# Patient Record
Sex: MALE | Race: WHITE | NOT HISPANIC OR LATINO | URBAN - METROPOLITAN AREA
[De-identification: names, ages, dates, MRNs, and addresses within clinical notes are randomized per-mention and may not be internally consistent; named-entity substitution may affect disease eponyms.]

---

## 2023-10-25 RX ORDER — FLUOXETINE HYDROCHLORIDE 40 MG/1
40 CAPSULE ORAL DAILY
COMMUNITY
End: 2024-02-23 | Stop reason: WASHOUT

## 2023-11-16 ENCOUNTER — TELEPHONE (OUTPATIENT)
Dept: BEHAVIORAL HEALTH | Facility: CLINIC | Age: 18
End: 2023-11-16
Payer: COMMERCIAL

## 2023-11-16 RX ORDER — RISPERIDONE 1 MG/1
TABLET ORAL
COMMUNITY
End: 2024-02-15 | Stop reason: WASHOUT

## 2023-11-17 ENCOUNTER — TELEMEDICINE (OUTPATIENT)
Dept: BEHAVIORAL HEALTH | Facility: CLINIC | Age: 18
End: 2023-11-17
Payer: COMMERCIAL

## 2023-11-17 DIAGNOSIS — F41.1 GENERALIZED ANXIETY DISORDER: ICD-10-CM

## 2023-11-17 DIAGNOSIS — F41.1 GAD (GENERALIZED ANXIETY DISORDER): ICD-10-CM

## 2023-11-17 PROCEDURE — 99213 OFFICE O/P EST LOW 20 MIN: CPT | Performed by: CLINICAL NURSE SPECIALIST

## 2023-11-17 ASSESSMENT — ABNORMAL INVOLUNTARY MOVEMENT SCALE (AIMS)
AIMS_PATIENT_INCAPACITATION: NONE, NORMAL
LOWER_BODY_EXTREMITIES: NONE, NORMAL
PATIENT_WEARS_DENTURES: NO
AIMS_SEVERITY: 0
AIMS_PATIENT_AWARENESS: NO AWARENESS
FACIAL_EXPRESSION_MUSCLES: NONE, NORMAL
JAW: NONE, NORMAL
UPPER_BODY_EXTREMITIES: NONE, NORMAL
TONGUE: NONE, NORMAL
LIPS_PARIETAL: NONE, NORMAL
CURRENT_PROBLEMS_TEETH_DENTURES: NO
NECK_SHOULDER_HIPS: NONE, NORMAL

## 2023-11-17 NOTE — PROGRESS NOTES
"Outpatient Child and Adolescent Psychiatry      Treatment Plan/Recommendations:   continue Risperdal 1 mg 1 in am, 2 at night -anxiety, mood, impulse control   labs ordered and please obtain   continue Prozac 40 mg 1 daily - OCD, anxiety, depression   continue Prozac 10 mg 1 daily - OCD, anxiety, depression   continue in therapy - Roxy Toney   see me in 3 - 6 months   Mauro in agreement, call with questions     Provider Impression:   Anxiety, mood - stable medication of benefit   ADHD - doing well, not on medication   ASD - continues to work on social skills     Diagnosis: There is no problem list on file for this patient.      Reason for Visit:   Anxiety, depression , ASD, ADHD     HPI:   Mauro is a 18 y.o male who lives with parents, 2 younger brothers, taking classes at [employer].  Last seen in May.  Continues to take Prozac 50 mg along with risperdal 1 mg, 1 in am and 2 at night for depression, anxiety, OCD. No reported side effects.   Mauro states things are going well with school and work.    He has good grades.        He says things at home with mom and dad are  \"great\"       Will see his grandparents over the holidays which he is looking forward to.        He says his mood has been pretty good especially when things are going well and his way.  He has been doing well keeping his cool even in situations that may be challenging.  He walks away in a calm manner.  He is feeling positive about his future in terms of a career.  He has not had any SI or thoughts of death. He did feel hopeless about his [employer] class last semester, but he is going to retake it next semester and feels positive about this.    He has friends and they hang out.   He has not had any significant OCD symptoms in a long time   no tics recently   sleeping and eating well   weight is in the 190s , hoping to get a gym membership and go with his mom   He does not think he had labs last year   He feels good about his medication and that is works " well        Review of Systems    Depressive Symptoms:. denies symptoms, no reported thoughts of death or SI, no self harm.   Anxiety Symptoms:. stable   Inattentive Symptoms:. doing well in school and work.   Other Symptoms/ Concerns:. ASD - making progress with social skills , continues to work on flexibility and self calming.   All other systems have been reviewed and are negative for complaint.     Constitutional: normal sleeping and normal appetite.   Cardiovascular: no chest pain and no palpitations.   Respiratory: no asthma/reactive airway disease.   Gastrointestinal: no abdominal pain and no reflux.   Neurological: not recent tics,  no headache and no seizures   Hematologic/Lymphatic: Labs WNL May 2022.         Current Medications:    Current Outpatient Medications:     FLUoxetine (PROzac) 40 mg capsule, TAKE 1 CAPSULE BY MOUTH EVERY DAY, Disp: 90 capsule, Rfl: 0    FLUoxetine (PROzac) 40 mg capsule, Take 1 capsule (40 mg) by mouth once daily., Disp: , Rfl:     risperiDONE (RisperDAL) 1 mg tablet, TAKE 1 TABLET BY MOUTH EVERY MORNING AND 2 TABLETS AT NIGHT, Disp: 270 tablet, Rfl: 0    risperiDONE (RisperDAL) 1 mg tablet, TAKE 1 TABLET BY MOUTH EVERY MORNING AND 2 TABLETS AT NIGHT, Disp: , Rfl:       No family history on file.   Past Medical History:   Diagnosis Date    Attention-deficit hyperactivity disorder, other type 05/22/2017    Attention-deficit hyperactivity disorder, other type    Autistic disorder 05/13/2022    Autism spectrum disorder    Depression, unspecified 03/10/2022    Depression    Generalized anxiety disorder 05/13/2022    Generalized anxiety disorder    Other specified health status     Known health problems: none    Tic disorder, unspecified 07/03/2018    Tic disorder    Tic disorder, unspecified 03/10/2022    Obsessive-compulsive disorder with absent insight or delusional beliefs, tic-related          Objective   Mental Status Exam:    See screening     Leonard Garcia, APRN-CNS

## 2023-12-04 ENCOUNTER — LAB (OUTPATIENT)
Dept: LAB | Facility: LAB | Age: 18
End: 2023-12-04
Payer: COMMERCIAL

## 2023-12-04 DIAGNOSIS — R17 ELEVATED BILIRUBIN: ICD-10-CM

## 2023-12-04 DIAGNOSIS — F41.1 GAD (GENERALIZED ANXIETY DISORDER): ICD-10-CM

## 2023-12-04 LAB
ALBUMIN SERPL BCP-MCNC: 4.5 G/DL (ref 3.4–5)
ALP SERPL-CCNC: 72 U/L (ref 33–120)
ALT SERPL W P-5'-P-CCNC: 49 U/L (ref 10–52)
ANION GAP SERPL CALC-SCNC: 11 MMOL/L (ref 10–20)
AST SERPL W P-5'-P-CCNC: 26 U/L (ref 9–39)
BASOPHILS # BLD AUTO: 0.02 X10*3/UL (ref 0–0.1)
BASOPHILS NFR BLD AUTO: 0.4 %
BILIRUB SERPL-MCNC: 1.5 MG/DL (ref 0–1.2)
BUN SERPL-MCNC: 15 MG/DL (ref 6–23)
CALCIUM SERPL-MCNC: 9.4 MG/DL (ref 8.6–10.3)
CHLORIDE SERPL-SCNC: 105 MMOL/L (ref 98–107)
CHOLEST SERPL-MCNC: 150 MG/DL (ref 0–199)
CHOLESTEROL/HDL RATIO: 4
CO2 SERPL-SCNC: 25 MMOL/L (ref 21–32)
CREAT SERPL-MCNC: 0.8 MG/DL (ref 0.5–1.3)
EOSINOPHIL # BLD AUTO: 0.19 X10*3/UL (ref 0–0.7)
EOSINOPHIL NFR BLD AUTO: 3.7 %
ERYTHROCYTE [DISTWIDTH] IN BLOOD BY AUTOMATED COUNT: 11.9 % (ref 11.5–14.5)
GFR SERPL CREATININE-BSD FRML MDRD: >90 ML/MIN/1.73M*2
GLUCOSE SERPL-MCNC: 77 MG/DL (ref 74–99)
HCT VFR BLD AUTO: 45 % (ref 41–52)
HDLC SERPL-MCNC: 37.1 MG/DL
HGB BLD-MCNC: 15 G/DL (ref 13.5–17.5)
IMM GRANULOCYTES # BLD AUTO: 0.02 X10*3/UL (ref 0–0.7)
IMM GRANULOCYTES NFR BLD AUTO: 0.4 % (ref 0–0.9)
LDLC SERPL CALC-MCNC: 99 MG/DL
LYMPHOCYTES # BLD AUTO: 1.7 X10*3/UL (ref 1.2–4.8)
LYMPHOCYTES NFR BLD AUTO: 33.5 %
MCH RBC QN AUTO: 29.4 PG (ref 26–34)
MCHC RBC AUTO-ENTMCNC: 33.3 G/DL (ref 32–36)
MCV RBC AUTO: 88 FL (ref 80–100)
MONOCYTES # BLD AUTO: 0.45 X10*3/UL (ref 0.1–1)
MONOCYTES NFR BLD AUTO: 8.9 %
NEUTROPHILS # BLD AUTO: 2.69 X10*3/UL (ref 1.2–7.7)
NEUTROPHILS NFR BLD AUTO: 53.1 %
NON HDL CHOLESTEROL: 113 MG/DL (ref 0–119)
NRBC BLD-RTO: 0 /100 WBCS (ref 0–0)
PLATELET # BLD AUTO: 248 X10*3/UL (ref 150–450)
POTASSIUM SERPL-SCNC: 4.1 MMOL/L (ref 3.5–5.3)
PROT SERPL-MCNC: 7.1 G/DL (ref 6.4–8.2)
RBC # BLD AUTO: 5.11 X10*6/UL (ref 4.5–5.9)
SODIUM SERPL-SCNC: 137 MMOL/L (ref 136–145)
TRIGL SERPL-MCNC: 72 MG/DL (ref 0–149)
VLDL: 14 MG/DL (ref 0–40)
WBC # BLD AUTO: 5.1 X10*3/UL (ref 4.4–11.3)

## 2023-12-04 PROCEDURE — 80061 LIPID PANEL: CPT

## 2023-12-04 PROCEDURE — 36415 COLL VENOUS BLD VENIPUNCTURE: CPT

## 2023-12-04 PROCEDURE — 82248 BILIRUBIN DIRECT: CPT

## 2023-12-04 PROCEDURE — 83036 HEMOGLOBIN GLYCOSYLATED A1C: CPT

## 2023-12-04 PROCEDURE — 80053 COMPREHEN METABOLIC PANEL: CPT

## 2023-12-04 PROCEDURE — 85025 COMPLETE CBC W/AUTO DIFF WBC: CPT

## 2023-12-05 ENCOUNTER — TELEPHONE (OUTPATIENT)
Dept: BEHAVIORAL HEALTH | Facility: CLINIC | Age: 18
End: 2023-12-05
Payer: COMMERCIAL

## 2023-12-05 DIAGNOSIS — F42.9 OBSESSIVE-COMPULSIVE DISORDER, UNSPECIFIED TYPE: ICD-10-CM

## 2023-12-05 LAB
EST. AVERAGE GLUCOSE BLD GHB EST-MCNC: 94 MG/DL
HBA1C MFR BLD: 4.9 %

## 2023-12-05 NOTE — PROGRESS NOTES
Reviewed labs with mother, bilirubin slightly elevated, other liver function WNL   Reviewed risperdal can impact liver function, bilirubin not as common, can reduce dose and recheck, also would see PCP to rule out other reason   Mother in agreement   Lower risperdal 1 mg 1 in am, 1 1/2 at night (lowering by 0.5 mg)   Will recheck labs in a month  Update with any concerns with lower dose   Mother in agreement

## 2023-12-06 ENCOUNTER — OFFICE VISIT (OUTPATIENT)
Dept: PRIMARY CARE | Facility: CLINIC | Age: 18
End: 2023-12-06
Payer: COMMERCIAL

## 2023-12-06 VITALS
HEIGHT: 71 IN | HEART RATE: 72 BPM | WEIGHT: 210 LBS | BODY MASS INDEX: 29.4 KG/M2 | SYSTOLIC BLOOD PRESSURE: 120 MMHG | DIASTOLIC BLOOD PRESSURE: 78 MMHG

## 2023-12-06 DIAGNOSIS — F84.0 AUTISM SPECTRUM DISORDER (HHS-HCC): ICD-10-CM

## 2023-12-06 DIAGNOSIS — M79.672 LEFT FOOT PAIN: ICD-10-CM

## 2023-12-06 DIAGNOSIS — R17 ELEVATED BILIRUBIN: Primary | ICD-10-CM

## 2023-12-06 PROBLEM — D72.819 LEUKOPENIA: Status: ACTIVE | Noted: 2023-12-06

## 2023-12-06 PROBLEM — F32.A DEPRESSION: Status: ACTIVE | Noted: 2023-12-06

## 2023-12-06 PROBLEM — F90.8 ATTENTION-DEFICIT HYPERACTIVITY DISORDER, OTHER TYPE: Status: ACTIVE | Noted: 2023-12-06

## 2023-12-06 PROBLEM — F95.9 TIC DISORDER: Status: ACTIVE | Noted: 2023-12-06

## 2023-12-06 PROBLEM — F41.1 GENERALIZED ANXIETY DISORDER: Status: ACTIVE | Noted: 2023-12-06

## 2023-12-06 LAB — BILIRUB DIRECT SERPL-MCNC: 0.2 MG/DL (ref 0–0.3)

## 2023-12-06 PROCEDURE — 99214 OFFICE O/P EST MOD 30 MIN: CPT | Performed by: FAMILY MEDICINE

## 2023-12-06 PROCEDURE — 1036F TOBACCO NON-USER: CPT | Performed by: FAMILY MEDICINE

## 2023-12-06 ASSESSMENT — PATIENT HEALTH QUESTIONNAIRE - PHQ9
SUM OF ALL RESPONSES TO PHQ9 QUESTIONS 1 AND 2: 0
1. LITTLE INTEREST OR PLEASURE IN DOING THINGS: NOT AT ALL
2. FEELING DOWN, DEPRESSED OR HOPELESS: NOT AT ALL

## 2023-12-06 NOTE — ASSESSMENT & PLAN NOTE
I agree that his elevated bilirubin is likely secondary to either Risperdal and/or Gilbert's syndrome.  I think it is reasonable to attempt to reduce the Risperdal dose, but then consider obtaining another bilirubin about 4 weeks after doing so    Your conjugated, or direct bilirubin from the blood work from December 4 was noted to be 0.2 which indicates that your indirect bilirubin is 1.3 which is a bit elevated so this is something we will continue to monitor going forward    If it continues to stay elevated after decreasing the Risperdal dose, we will get a liver ultrasound and maybe even consider a hepatology consult

## 2023-12-06 NOTE — ASSESSMENT & PLAN NOTE
I do not think that there is any worry about a stress fracture, although may be a likely strain of the ATF  Suggested new shoe wear that has nice heel and arch supports

## 2023-12-06 NOTE — PROGRESS NOTES
Subjective   Patient ID: Mauro Lindsay is a 18 y.o. male who presents for lab review.    Past Medical, Surgical, and Family History reviewed and updated in chart.    Reviewed all medications by prescribing practitioner or clinical pharmacist (such as prescriptions, OTCs, herbal therapies and supplements) and documented in the medical record.    HPI  1.  Asymptomatic elevated total bilirubin.  Mauro has a medical history notable for Obsessive-Compulsive Disorder (OCD), Autism, Anxiety, and Depression. His psychiatric medications have been managed by a Nurse Practitioner since his seventh-grade year. He is currently on Prozac 40 mg and Risperdal 3 mg.    The patient is now presenting with asymptomatic total hyperbilirubinemia. His most recent Comprehensive Metabolic Panel (CMP) was remarkable for a total bilirubin of 1.5 while the rest of his liver enzymes were within normal limits. He denies any current right upper quadrant pain or jaundice, even during periods of heightened stress.    Given his elevated bilirubin levels, his Nurse Practitioner is planning to reduce his Risperdal dosage from 3 mg to 2.5 mg daily. However, Mauro's mother reports that they have not yet reduced the medication dose, as they plan on doing it after his final examinations.    2.  Left foot pain.  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    All other systems have been reviewed and are negative and noncontributory to this patient's current ailments.    Past Medical History:   Diagnosis Date    Attention-deficit hyperactivity disorder, other type 05/22/2017    Attention-deficit  "hyperactivity disorder, other type    Autistic disorder 05/13/2022    Autism spectrum disorder    Depression, unspecified 03/10/2022    Depression    Generalized anxiety disorder 05/13/2022    Generalized anxiety disorder    Other specified health status     Known health problems: none    Tic disorder, unspecified 07/03/2018    Tic disorder    Tic disorder, unspecified 03/10/2022    Obsessive-compulsive disorder with absent insight or delusional beliefs, tic-related     Past Surgical History:   Procedure Laterality Date    OTHER SURGICAL HISTORY  04/27/2015    Prior Surgical Procedure Not Done     Social History     Tobacco Use    Smoking status: Never    Smokeless tobacco: Never   Substance Use Topics    Alcohol use: Never    Drug use: Never     No family history on file.  Immunization History   Administered Date(s) Administered    Flu vaccine (IIV4), preservative free *Check age/dose* 12/07/2015, 11/05/2016, 11/15/2017, 12/06/2019, 10/08/2020, 10/08/2021, 09/22/2022    HPV 9-valent vaccine (GARDASIL 9) 02/05/2020, 09/09/2020, 02/10/2021    Influenza, injectable, MDCK, preservative free, quadrivalent 11/07/2018    Meningococcal MCV4P 06/17/2016, 02/10/2021    Pfizer Purple Cap SARS-CoV-2 04/05/2021, 04/26/2021, 12/01/2021    Tdap vaccine, age 7 year and older (BOOSTRIX) 04/27/2015     Current Outpatient Medications   Medication Instructions    FLUoxetine (PROZAC) 40 mg, oral, Daily    FLUoxetine (PROZAC) 40 mg, oral, Daily    risperiDONE (RisperDAL) 1 mg tablet TAKE 1 TABLET BY MOUTH EVERY MORNING AND 2 TABLETS AT NIGHT    risperiDONE (RisperDAL) 1 mg tablet TAKE 1 TABLET BY MOUTH EVERY MORNING AND 2 TABLETS AT NIGHT     No Known Allergies    Objective   Vitals:    12/06/23 0934   BP: 120/78   Pulse: 72   Weight: 95.3 kg (210 lb)   Height: 1.795 m (5' 10.67\")     Body mass index is 29.56 kg/m².    BP Readings from Last 3 Encounters:   12/06/23 120/78   07/27/22 134/72   07/27/22 109/69      Wt Readings from Last 3 " Encounters:   12/06/23 95.3 kg (210 lb)   07/27/22 85.4 kg (188 lb 4 oz) (88 %, Z= 1.16)*   03/11/21 88 kg (93 %, Z= 1.46)*     * Growth percentiles are based on [facility] (Boys, 2-20 Years) data.        Lab on 12/04/2023   Component Date Value    WBC 12/04/2023 5.1     nRBC 12/04/2023 0.0     RBC 12/04/2023 5.11     Hemoglobin 12/04/2023 15.0     Hematocrit 12/04/2023 45.0     MCV 12/04/2023 88     MCH 12/04/2023 29.4     MCHC 12/04/2023 33.3     RDW 12/04/2023 11.9     Platelets 12/04/2023 248     Neutrophils % 12/04/2023 53.1     Immature Granulocytes %,* 12/04/2023 0.4     Lymphocytes % 12/04/2023 33.5     Monocytes % 12/04/2023 8.9     Eosinophils % 12/04/2023 3.7     Basophils % 12/04/2023 0.4     Neutrophils Absolute 12/04/2023 2.69     Immature Granulocytes Ab* 12/04/2023 0.02     Lymphocytes Absolute 12/04/2023 1.70     Monocytes Absolute 12/04/2023 0.45     Eosinophils Absolute 12/04/2023 0.19     Basophils Absolute 12/04/2023 0.02     Glucose 12/04/2023 77     Sodium 12/04/2023 137     Potassium 12/04/2023 4.1     Chloride 12/04/2023 105     Bicarbonate 12/04/2023 25     Anion Gap 12/04/2023 11     Urea Nitrogen 12/04/2023 15     Creatinine 12/04/2023 0.80     eGFR 12/04/2023 >90     Calcium 12/04/2023 9.4     Albumin 12/04/2023 4.5     Alkaline Phosphatase 12/04/2023 72     Total Protein 12/04/2023 7.1     AST 12/04/2023 26     Bilirubin, Total 12/04/2023 1.5 (H)     ALT 12/04/2023 49     Hemoglobin A1C 12/04/2023 4.9     Estimated Average Glucose 12/04/2023 94     Cholesterol 12/04/2023 150     HDL-Cholesterol 12/04/2023 37.1     Cholesterol/HDL Ratio 12/04/2023 4.0     LDL Calculated 12/04/2023 99     VLDL 12/04/2023 14     Triglycerides 12/04/2023 72     Non HDL Cholesterol 12/04/2023 113     Bilirubin, Direct 12/04/2023 0.2      Physical Exam  CONSTITUTIONAL - well nourished, well developed, looks like stated age, in no acute distress, not ill-appearing, and not tired appearing  SKIN - normal skin color  and pigmentation, normal skin turgor without rash, lesions, or nodules visualized  HEAD - no trauma, normocephalic  EYES - pupils are equal and reactive to light, extraocular muscles are intact, and normal external exam  CHEST - clear to auscultation, no wheezing, no crackles and no rales, good effort  CARDIAC - regular rate and regular rhythm, no skipped beats, no murmur  ABDOMEN - no organomegaly, soft, nontender, nondistended, normal bowel sounds, no guarding/rebound/rigidity, negative McBurney sign and negative Edwards sign  EXTREMITIES - no obvious or evident edema, no obvious or evident deformities; left foot with some reproducible tenderness along the ATF, but no obvious bony deformities, swelling, warmth, ecchymosis, or decreased range of motion  NEUROLOGICAL - normal gait, normal balance, normal motor, no ataxia, DTRs equal and symmetrical; alert, oriented and no focal signs  PSYCHIATRIC - alert, pleasant and cordial, age-appropriate    Assessment/Plan   Problem List Items Addressed This Visit       Autism spectrum disorder     Continue to follow with NP in psychiatry for medication adjustments         Elevated bilirubin - Primary     I agree that his elevated bilirubin is likely secondary to either Risperdal and/or Gilbert's syndrome.  I think it is reasonable to attempt to reduce the Risperdal dose, but then consider obtaining another bilirubin about 4 weeks after doing so    Your conjugated, or direct bilirubin from the blood work from December 4 was noted to be 0.2 which indicates that your indirect bilirubin is 1.3 which is a bit elevated so this is something we will continue to monitor going forward    If it continues to stay elevated after decreasing the Risperdal dose, we will get a liver ultrasound and maybe even consider a hepatology consult         Relevant Orders    Bilirubin, direct (Completed)    Bilirubin, direct    Bilirubin, total    Left foot pain     I do not think that there is any worry  about a stress fracture, although may be a likely strain of the ATF  Suggested new shoe wear that has nice heel and arch supports

## 2023-12-07 NOTE — RESULT ENCOUNTER NOTE
Direct bilirubin is 0.2 which means the indirect is 1.3    We will continue with the plan, taper the Risperdal, repeat blood work 4 weeks, and if it continues to remain elevated then liver ultrasound

## 2024-02-15 DIAGNOSIS — F41.1 GENERALIZED ANXIETY DISORDER: ICD-10-CM

## 2024-02-15 RX ORDER — RISPERIDONE 0.5 MG/1
TABLET ORAL
Qty: 90 TABLET | Refills: 0 | Status: SHIPPED | OUTPATIENT
Start: 2024-02-15 | End: 2024-05-09

## 2024-02-15 RX ORDER — RISPERIDONE 1 MG/1
1 TABLET ORAL 2 TIMES DAILY
Qty: 180 TABLET | Refills: 0 | Status: SHIPPED | OUTPATIENT
Start: 2024-02-15 | End: 2024-05-17 | Stop reason: SDUPTHER

## 2024-02-15 NOTE — PROGRESS NOTES
Mother states Mauro reduced risperdal to 1 mg in am 1.5 mg at night   Asked for 0. 5 mg pill so not having to break the 1 mg   Sent in new scripts and made apt for next Friday 23rd at 10 am

## 2024-02-23 ENCOUNTER — LAB (OUTPATIENT)
Dept: LAB | Facility: LAB | Age: 19
End: 2024-02-23
Payer: COMMERCIAL

## 2024-02-23 ENCOUNTER — TELEMEDICINE (OUTPATIENT)
Dept: BEHAVIORAL HEALTH | Facility: CLINIC | Age: 19
End: 2024-02-23
Payer: COMMERCIAL

## 2024-02-23 DIAGNOSIS — F41.1 GENERALIZED ANXIETY DISORDER: ICD-10-CM

## 2024-02-23 DIAGNOSIS — F84.0 AUTISM SPECTRUM DISORDER (HHS-HCC): ICD-10-CM

## 2024-02-23 DIAGNOSIS — F90.2 ATTENTION DEFICIT HYPERACTIVITY DISORDER (ADHD), COMBINED TYPE: ICD-10-CM

## 2024-02-23 DIAGNOSIS — R17 ELEVATED BILIRUBIN: ICD-10-CM

## 2024-02-23 LAB — BILIRUB DIRECT SERPL-MCNC: 0.2 MG/DL (ref 0–0.3)

## 2024-02-23 PROCEDURE — 1036F TOBACCO NON-USER: CPT | Performed by: CLINICAL NURSE SPECIALIST

## 2024-02-23 PROCEDURE — 99213 OFFICE O/P EST LOW 20 MIN: CPT | Performed by: CLINICAL NURSE SPECIALIST

## 2024-02-23 PROCEDURE — 82247 BILIRUBIN TOTAL: CPT

## 2024-02-23 PROCEDURE — 82248 BILIRUBIN DIRECT: CPT

## 2024-02-23 RX ORDER — FLUOXETINE 10 MG/1
CAPSULE ORAL
Qty: 90 CAPSULE | Refills: 0 | Status: SHIPPED | OUTPATIENT
Start: 2024-02-23 | End: 2024-05-17 | Stop reason: SDUPTHER

## 2024-02-23 RX ORDER — FLUOXETINE HYDROCHLORIDE 40 MG/1
40 CAPSULE ORAL DAILY
Qty: 90 CAPSULE | Refills: 0 | Status: SHIPPED | OUTPATIENT
Start: 2024-02-23 | End: 2024-05-17 | Stop reason: SDUPTHER

## 2024-02-23 NOTE — PROGRESS NOTES
Outpatient Child and Adolescent Psychiatry      Treatment Plan/Recommendations:   continue Risperdal 1 mg po BID and risperdal 0.5 mg 1 at night -anxiety, mood, impulse control   labs completed today   Will assess to further lower over time   continue Prozac 40 mg 1 daily - OCD, anxiety, depression   continue Prozac 10 mg 1 daily - OCD, anxiety, depression   continue in therapy - Roxy Toney   see me in 3 months   Mauro in agreement, call with questions     Provider Impression:   Anxiety, mood - stable with lower dose risperdal   ADHD - doing well, not on medication   ASD - significant progress with social skills     Diagnosis: There is no problem list on file for this patient.      Reason for Visit:   Anxiety, depression , ASD, ADHD     HPI:   Mauro is a 18 y.o male who lives with parents, 2 younger brothers, taking classes at [employer].  Last seen November  Continues to take Prozac 50 mg daily (40 mg and 10 mg) lowered risperdal a few months ago to 1 mg in am, 1.5 mg at night. Says he had some trouble sleeping when lowered initially, but overall has been stable.   Mauro states things are going well at school and work.  He states he has spring break coming up in a few weeks and could use it.  He has a lot of work with [employer] class but has been keeping up.  He is also taking an architecture class and green building.  He he been working a lot so not home much.  On weekends he hangs out with friends, play cards and watch movies.  Says his mood has been good.  Mother feels Mauro has adjusted well to lowering dose, initially sleep a little off and slightly more irritable.  He states he he has not had any significant OCD symptoms, no depressive episodes.  He gets along well with parents.  Sees Roxy for therapy and able to talk to her openly.   no tics recently   Weight has been around 200 natalia   No reported substance use       Review of Systems      Psych ROS:  Depressive Symptoms:. denies symptoms, no reported thoughts  of death or SI, no self harm.   Anxiety Symptoms:. stable   Inattentive Symptoms:. doing well in school and work.   Other Symptoms/ Concerns:. ASD - significant progress with social skills, flexibility and self calming.   All other systems have been reviewed and are negative for complaint.     Medical ROS  Constitutional: normal sleeping and normal appetite.   Cardiovascular: no chest pain and no palpitations.   Respiratory: no asthma/reactive airway disease.   Gastrointestinal: no abdominal pain and no reflux.   Neurological: not recent tics,  no headache and no seizures   Hematologic/Lymphatic: Labs just done today         Current Medications:    Current Outpatient Medications:     FLUoxetine (PROzac) 40 mg capsule, TAKE 1 CAPSULE BY MOUTH EVERY DAY, Disp: 90 capsule, Rfl: 0    FLUoxetine (PROzac) 40 mg capsule, Take 1 capsule (40 mg) by mouth once daily., Disp: , Rfl:     risperiDONE (RisperDAL) 1 mg tablet, TAKE 1 TABLET BY MOUTH EVERY MORNING AND 2 TABLETS AT NIGHT, Disp: 270 tablet, Rfl: 0    risperiDONE (RisperDAL) 1 mg tablet, TAKE 1 TABLET BY MOUTH EVERY MORNING AND 2 TABLETS AT NIGHT, Disp: , Rfl:       No family history on file.   Past Medical History:   Diagnosis Date    Attention-deficit hyperactivity disorder, other type 05/22/2017    Attention-deficit hyperactivity disorder, other type    Autistic disorder 05/13/2022    Autism spectrum disorder    Depression, unspecified 03/10/2022    Depression    Generalized anxiety disorder 05/13/2022    Generalized anxiety disorder    Other specified health status     Known health problems: none    Tic disorder, unspecified 07/03/2018    Tic disorder    Tic disorder, unspecified 03/10/2022    Obsessive-compulsive disorder with absent insight or delusional beliefs, tic-related          Objective   Mental Status Exam:    See screening     Leonard Garcia, APRN-CNS

## 2024-02-25 DIAGNOSIS — R17 ELEVATED BILIRUBIN: Primary | ICD-10-CM

## 2024-02-25 LAB — BILIRUB SERPL-MCNC: 1.1 MG/DL (ref 0–1.2)

## 2024-02-26 NOTE — RESULT ENCOUNTER NOTE
I am pleased to inform you that your direct and total bilirubin levels have returned to normal ranges. This is a positive development and indicates improvement in liver function.    Given the normalization of your bilirubin levels and the overall improvement in your recent blood work, further evaluation is not deemed necessary at this time. While the exact cause of the previous abnormal results remains unclear, it is possible that the adjustment in your Risperdal medication may have played a role in the improvement observed in your blood work.

## 2024-05-17 ENCOUNTER — TELEMEDICINE (OUTPATIENT)
Dept: BEHAVIORAL HEALTH | Facility: CLINIC | Age: 19
End: 2024-05-17
Payer: COMMERCIAL

## 2024-05-17 DIAGNOSIS — F90.2 ATTENTION DEFICIT HYPERACTIVITY DISORDER (ADHD), COMBINED TYPE: ICD-10-CM

## 2024-05-17 DIAGNOSIS — F84.0 AUTISM SPECTRUM DISORDER (HHS-HCC): ICD-10-CM

## 2024-05-17 DIAGNOSIS — F41.1 GENERALIZED ANXIETY DISORDER: ICD-10-CM

## 2024-05-17 PROCEDURE — 99213 OFFICE O/P EST LOW 20 MIN: CPT | Performed by: CLINICAL NURSE SPECIALIST

## 2024-05-17 RX ORDER — FLUOXETINE HYDROCHLORIDE 40 MG/1
40 CAPSULE ORAL DAILY
Qty: 90 CAPSULE | Refills: 0 | Status: SHIPPED | OUTPATIENT
Start: 2024-05-17

## 2024-05-17 RX ORDER — RISPERIDONE 0.5 MG/1
0.5 TABLET ORAL 2 TIMES DAILY
Qty: 180 TABLET | Refills: 0 | Status: SHIPPED | OUTPATIENT
Start: 2024-05-17 | End: 2024-08-15

## 2024-05-17 RX ORDER — FLUOXETINE 10 MG/1
CAPSULE ORAL
Qty: 90 CAPSULE | Refills: 0 | Status: SHIPPED | OUTPATIENT
Start: 2024-05-17

## 2024-05-17 RX ORDER — RISPERIDONE 1 MG/1
1 TABLET ORAL NIGHTLY
Qty: 90 TABLET | Refills: 0 | Status: SHIPPED | OUTPATIENT
Start: 2024-05-17 | End: 2024-08-15

## 2024-05-17 ASSESSMENT — ABNORMAL INVOLUNTARY MOVEMENT SCALE (AIMS)
UPPER_BODY_EXTREMITIES: NONE, NORMAL
PATIENT_WEARS_DENTURES: NO
AIMS_PATIENT_INCAPACITATION: NONE, NORMAL
NECK_SHOULDER_HIPS: NONE, NORMAL
FACIAL_EXPRESSION_MUSCLES: NONE, NORMAL
TONGUE: NONE, NORMAL
LIPS_PARIETAL: NONE, NORMAL
AIMS_SEVERITY: 0
AIMS_PATIENT_AWARENESS: NO AWARENESS
JAW: NONE, NORMAL
CURRENT_PROBLEMS_TEETH_DENTURES: NO
LOWER_BODY_EXTREMITIES: NONE, NORMAL

## 2024-05-17 NOTE — PROGRESS NOTES
Outpatient Child and Adolescent Psychiatry      Treatment Plan/Recommendations:   Lower Risperdal 0.5 mg 1 po BID and risperdal 1 mg take 1 at night -anxiety, mood, impulse control   Reviewed symptoms to monitor for as lowering medication, both physical and also clinical symptoms that may resurface   labs ordered   Will assess to further lower over time   continue Prozac 40 mg 1 daily - OCD, anxiety, depression   continue Prozac 10 mg 1 daily - OCD, anxiety, depression   continue in therapy - Roxy Toney   see me in July   Mauro in agreement, call with questions     Provider Impression:   Anxiety, mood - stable with lower dose risperdal   Will continue to lower   ADHD - doing well, not on medication   ASD - significant progress with social skills     Diagnosis: There is no problem list on file for this patient.      Reason for Visit:   Anxiety, depression , ASD, ADHD   Mauro in agreement for father to participate in session    HPI:   Mauro is a 19 y.o male who lives with parents, 2 younger brothers, taking classes at [employer] and works.  Last seen 3 months ago  Continues to take Prozac 50 mg daily (40 mg and 10 mg) continues risperdal 1 mg in am, 1.5 mg at night.  No side effects.  He takes his medication daily  He ended semester well and now is working 4 days per week (evenings 5 pm to close) will take one online class this summer.  He is working towards associates in engineering.  Mauro says he has been doing well.  He has not had any issues reducing reducing risperdal. No agitation or re-onset of OCD symptoms.    He is looking forward to going away with family to [place] and [place].    He has friends and hangs out with them. He is getting along with family.    He has not had depression episodes   He has been sleeping more per father wakes up and goes back to sleep and father asked about reducing morning dose.  Mauro says he is taking advantage of no class and also works later   Father agrees Mauro is doing well,  wants to make sure he knows it is ok for parents to ask him to help around the house sometimes which Mauro says he is fine with   Weight is 200    No reported substance use       Review of Systems      Psych ROS:  Depressive Symptoms:. denies symptoms, no reported thoughts of death or SI, no self harm.   Anxiety Symptoms:. stable   Inattentive Symptoms:. doing well in school and work.   Other Symptoms/ Concerns:. ASD - significant progress with social skills, flexibility and self calming.   All other systems have been reviewed and are negative for complaint.     Medical ROS  Constitutional: sleeps in due to evening work. and normal appetite.   Cardiovascular: no chest pain and no palpitations.   Respiratory: no asthma/reactive airway disease.   Gastrointestinal: no abdominal pain and no reflux.   Neurological: not recent tics,  no headache and no seizures         Current Medications:    Current Outpatient Medications:     FLUoxetine (PROzac) 40 mg capsule, TAKE 1 CAPSULE BY MOUTH EVERY DAY, Disp: 90 capsule, Rfl: 0    FLUoxetine (PROzac) 40 mg capsule, Take 1 capsule (40 mg) by mouth once daily., Disp: , Rfl:     risperiDONE (RisperDAL) 1 mg tablet, TAKE 1 TABLET BY MOUTH EVERY MORNING AND 2 TABLETS AT NIGHT, Disp: 270 tablet, Rfl: 0    risperiDONE (RisperDAL) 1 mg tablet, TAKE 1 TABLET BY MOUTH EVERY MORNING AND 2 TABLETS AT NIGHT, Disp: , Rfl:       No family history on file.   Past Medical History:   Diagnosis Date    Attention-deficit hyperactivity disorder, other type 05/22/2017    Attention-deficit hyperactivity disorder, other type    Autistic disorder 05/13/2022    Autism spectrum disorder    Depression, unspecified 03/10/2022    Depression    Generalized anxiety disorder 05/13/2022    Generalized anxiety disorder    Other specified health status     Known health problems: none    Tic disorder, unspecified 07/03/2018    Tic disorder    Tic disorder, unspecified 03/10/2022    Obsessive-compulsive disorder  with absent insight or delusional beliefs, tic-related          Objective   Mental Status Exam:    See screening     Leonard Garcia, APRN-CNS

## 2024-07-02 DIAGNOSIS — F41.1 GENERALIZED ANXIETY DISORDER: ICD-10-CM

## 2024-07-02 RX ORDER — RISPERIDONE 1 MG/1
1 TABLET ORAL NIGHTLY
Qty: 90 TABLET | Refills: 0 | Status: SHIPPED | OUTPATIENT
Start: 2024-07-02 | End: 2024-09-30

## 2024-07-02 RX ORDER — RISPERIDONE 0.5 MG/1
0.5 TABLET ORAL 2 TIMES DAILY
Qty: 180 TABLET | Refills: 0 | Status: SHIPPED | OUTPATIENT
Start: 2024-07-02 | End: 2024-09-30

## 2024-07-16 ENCOUNTER — APPOINTMENT (OUTPATIENT)
Dept: BEHAVIORAL HEALTH | Facility: CLINIC | Age: 19
End: 2024-07-16
Payer: COMMERCIAL

## 2024-07-16 DIAGNOSIS — F84.0 AUTISM SPECTRUM DISORDER (HHS-HCC): ICD-10-CM

## 2024-07-16 DIAGNOSIS — Z86.59 HISTORY OF OBSESSIVE COMPULSIVE DISORDER: ICD-10-CM

## 2024-07-16 DIAGNOSIS — F41.1 GENERALIZED ANXIETY DISORDER: ICD-10-CM

## 2024-07-16 DIAGNOSIS — F90.2 ATTENTION DEFICIT HYPERACTIVITY DISORDER (ADHD), COMBINED TYPE: ICD-10-CM

## 2024-07-16 PROCEDURE — 1036F TOBACCO NON-USER: CPT | Performed by: CLINICAL NURSE SPECIALIST

## 2024-07-16 PROCEDURE — 99213 OFFICE O/P EST LOW 20 MIN: CPT | Performed by: CLINICAL NURSE SPECIALIST

## 2024-07-16 RX ORDER — RISPERIDONE 1 MG/1
1.5 TABLET ORAL NIGHTLY
Qty: 135 TABLET | Refills: 1 | Status: SHIPPED | OUTPATIENT
Start: 2024-07-16 | End: 2025-01-12

## 2024-07-16 ASSESSMENT — ABNORMAL INVOLUNTARY MOVEMENT SCALE (AIMS)
LOWER_BODY_EXTREMITIES: NONE, NORMAL
FACIAL_EXPRESSION_MUSCLES: NONE, NORMAL
NECK_SHOULDER_HIPS: NONE, NORMAL
TONGUE: NONE, NORMAL
LIPS_PARIETAL: NONE, NORMAL
JAW: NONE, NORMAL
UPPER_BODY_EXTREMITIES: NONE, NORMAL
PATIENT_WEARS_DENTURES: NO
AIMS_PATIENT_INCAPACITATION: NONE, NORMAL
AIMS_PATIENT_AWARENESS: NO AWARENESS
CURRENT_PROBLEMS_TEETH_DENTURES: NO
AIMS_SEVERITY: 0

## 2024-07-16 NOTE — PROGRESS NOTES
Outpatient Child and Adolescent Psychiatry      Treatment Plan/Recommendations:   Lower Risperdal  1 mg 1 1/2 po at night po at night -anxiety, mood, impulse control   Reviewed has been stable with lower dose, improved day energy, ,continue to monitor symptoms as lowering medication, both physical and also clinical symptoms that may resurface   labs please   continue Prozac 40 mg 1 daily - OCD, anxiety, depression   continue Prozac 10 mg 1 daily - OCD, anxiety, depression   continue in therapy - Roxy Toney   see me in September and will transfer to adult psychiatry as reviewed APRN will be leaving    Mauro in agreement, call with questions     Provider Impression:   Anxiety, mood - stable with lower dose risperdal   Will continue to lower   ADHD - doing well, not on medication , in person learning mathew effective but is going to get assistance when needed for online classes   ASD - significant progress with social skills     Diagnosis: There is no problem list on file for this patient.      Reason for Visit:  Anxiety, depression , ASD, ADHD     Virtual or Telephone Consent    An interactive audio and video telecommunication system which permits real time communications between the patient (at the originating site) and provider (at the distant site) was utilized to provide this telehealth service.   Verbal consent was requested and obtained from Mauro Lindsay on this date, 07/16/24 for a telehealth visit.      HPI:   Mauro is a 19 y.o male who lives with parents, 2 younger brothers, taking classes at [employer] and works.  Last seen 2 months ago  Continues to take Prozac 50 mg daily (40 mg and 10 mg) for anxiety, depression. No side effects   Lowered Risperdal to 1 mg at night and risperdal 0.5 mg BID.  He states last week he was out of the morning dose.  He just restarted it two days ago though.  He felt he had better energy in the day without the morning dose.  He states no increase in OCD with lower dose risperdal.  No issues with his mood since lowering dose.  No agitation or irritability.  States he continues to do very well with self regulation.  His work is going well.  He finished Algebra and now completing Trigonometry. He says it is challenging as online is not his preferred way of learning. He is able to go in for in person assistance.  He denies other significant stressors.  He hangs out with his friends often.  States things are good with family.  Enjoyed [place] vacation and looking forward to going to [place]. His weight is the same  No reported substance use       Review of Systems      Psych ROS:  Depressive Symptoms:. denies symptoms, no reported thoughts of death or SI, no self harm.   Anxiety Symptoms:. stable   Inattentive Symptoms:. doing well in school and work.   Other Symptoms/ Concerns:. ASD - significant progress with social skills, flexibility and self calming.   All other systems have been reviewed and are negative for complaint.     Medical ROS  Constitutional: sleeps in due to evening work but better energy without morning risperdal. and normal appetite.   Cardiovascular: no chest pain and no palpitations.   Respiratory: no asthma/reactive airway disease.   Gastrointestinal: no abdominal pain and no reflux.   Neurological: not recent tics,  no headache and no seizures         Current Medications:    Current Outpatient Medications:     FLUoxetine (PROzac) 40 mg capsule, TAKE 1 CAPSULE BY MOUTH EVERY DAY, Disp: 90 capsule, Rfl: 0    FLUoxetine (PROzac) 40 mg capsule, Take 1 capsule (40 mg) by mouth once daily., Disp: , Rfl:     risperiDONE (RisperDAL) 1 mg tablet, TAKE 1 TABLET BY MOUTH EVERY MORNING AND 2 TABLETS AT NIGHT, Disp: 270 tablet, Rfl: 0    risperiDONE (RisperDAL) 1 mg tablet, TAKE 1 TABLET BY MOUTH EVERY MORNING AND 2 TABLETS AT NIGHT, Disp: , Rfl:       No family history on file.   Past Medical History:   Diagnosis Date    Attention-deficit hyperactivity disorder, other type 05/22/2017     Attention-deficit hyperactivity disorder, other type    Autistic disorder 05/13/2022    Autism spectrum disorder    Depression, unspecified 03/10/2022    Depression    Generalized anxiety disorder 05/13/2022    Generalized anxiety disorder    Other specified health status     Known health problems: none    Tic disorder, unspecified 07/03/2018    Tic disorder    Tic disorder, unspecified 03/10/2022    Obsessive-compulsive disorder with absent insight or delusional beliefs, tic-related          Objective   Mental Status Exam:    See screening     Leonard Garcia, APRN-CNS